# Patient Record
Sex: FEMALE | Race: WHITE | Employment: FULL TIME | URBAN - NONMETROPOLITAN AREA
[De-identification: names, ages, dates, MRNs, and addresses within clinical notes are randomized per-mention and may not be internally consistent; named-entity substitution may affect disease eponyms.]

---
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
## 2021-07-01 ENCOUNTER — OFFICE VISIT (OUTPATIENT)
Dept: FAMILY MEDICINE CLINIC | Age: 21
End: 2021-07-01
Payer: COMMERCIAL

## 2021-07-01 ENCOUNTER — HOSPITAL ENCOUNTER (OUTPATIENT)
Age: 21
Setting detail: SPECIMEN
Discharge: HOME | End: 2021-07-01
Payer: COMMERCIAL

## 2021-07-01 VITALS
HEIGHT: 65 IN | TEMPERATURE: 97.8 F | HEART RATE: 80 BPM | OXYGEN SATURATION: 99 % | SYSTOLIC BLOOD PRESSURE: 107 MMHG | RESPIRATION RATE: 12 BRPM | DIASTOLIC BLOOD PRESSURE: 70 MMHG | BODY MASS INDEX: 25.33 KG/M2 | WEIGHT: 152 LBS

## 2021-07-01 DIAGNOSIS — Z00.00 ENCOUNTER FOR MEDICAL EXAMINATION TO ESTABLISH CARE: Primary | ICD-10-CM

## 2021-07-01 DIAGNOSIS — R11.2 NAUSEA AND VOMITING, INTRACTABILITY OF VOMITING NOT SPECIFIED, UNSPECIFIED VOMITING TYPE: ICD-10-CM

## 2021-07-01 DIAGNOSIS — Z13.21 ENCOUNTER FOR VITAMIN DEFICIENCY SCREENING: ICD-10-CM

## 2021-07-01 DIAGNOSIS — Z13.29 THYROID DISORDER SCREEN: ICD-10-CM

## 2021-07-01 DIAGNOSIS — G43.909 MIGRAINE WITHOUT STATUS MIGRAINOSUS, NOT INTRACTABLE, UNSPECIFIED MIGRAINE TYPE: ICD-10-CM

## 2021-07-01 LAB
ABSOLUTE EOS #: 0.14 K/UL (ref 0–0.44)
ABSOLUTE IMMATURE GRANULOCYTE: 0.04 K/UL (ref 0–0.3)
ABSOLUTE LYMPH #: 2.43 K/UL (ref 1.2–5.2)
ABSOLUTE MONO #: 0.5 K/UL (ref 0.1–1.4)
ALBUMIN SERPL-MCNC: 4.8 G/DL (ref 3.5–5.2)
ALBUMIN/GLOBULIN RATIO: 1.8 (ref 1–2.5)
ALP BLD-CCNC: 69 U/L (ref 35–104)
ALT SERPL-CCNC: 15 U/L (ref 5–33)
ANION GAP SERPL CALCULATED.3IONS-SCNC: 14 MMOL/L (ref 9–17)
AST SERPL-CCNC: 17 U/L
BASOPHILS # BLD: 1 % (ref 0–2)
BASOPHILS ABSOLUTE: 0.07 K/UL (ref 0–0.2)
BILIRUB SERPL-MCNC: 0.32 MG/DL (ref 0.3–1.2)
BUN BLDV-MCNC: 7 MG/DL (ref 6–20)
BUN/CREAT BLD: NORMAL (ref 9–20)
CALCIUM SERPL-MCNC: 9.5 MG/DL (ref 8.6–10.4)
CHLORIDE BLD-SCNC: 105 MMOL/L (ref 98–107)
CO2: 20 MMOL/L (ref 20–31)
CREAT SERPL-MCNC: 0.5 MG/DL (ref 0.5–0.9)
DIFFERENTIAL TYPE: ABNORMAL
EOSINOPHILS RELATIVE PERCENT: 2 % (ref 1–4)
ESTIMATED AVERAGE GLUCOSE: 100 MG/DL
FERRITIN: 53 UG/L (ref 13–150)
FOLATE: 5.5 NG/ML
GFR AFRICAN AMERICAN: >60 ML/MIN
GFR NON-AFRICAN AMERICAN: >60 ML/MIN
GFR SERPL CREATININE-BSD FRML MDRD: NORMAL ML/MIN/{1.73_M2}
GFR SERPL CREATININE-BSD FRML MDRD: NORMAL ML/MIN/{1.73_M2}
GLUCOSE BLD-MCNC: 80 MG/DL (ref 70–99)
HBA1C MFR BLD: 5.1 % (ref 4–6)
HCT VFR BLD CALC: 45.2 % (ref 36.3–47.1)
HEMOGLOBIN: 14.4 G/DL (ref 11.9–15.1)
IMMATURE GRANULOCYTES: 1 %
INSULIN COMMENT: NORMAL
INSULIN REFERENCE RANGE:: NORMAL
INSULIN: 9.2 MU/L
IRON SATURATION: 22 % (ref 20–55)
IRON: 67 UG/DL (ref 37–145)
LYMPHOCYTES # BLD: 31 % (ref 25–45)
MAGNESIUM: 2.3 MG/DL (ref 1.6–2.6)
MCH RBC QN AUTO: 29.9 PG (ref 25.2–33.5)
MCHC RBC AUTO-ENTMCNC: 31.9 G/DL (ref 28.4–34.8)
MCV RBC AUTO: 93.8 FL (ref 82.6–102.9)
MONOCYTES # BLD: 6 % (ref 2–8)
NRBC AUTOMATED: 0 PER 100 WBC
PDW BLD-RTO: 12.1 % (ref 11.8–14.4)
PLATELET # BLD: 296 K/UL (ref 138–453)
PLATELET ESTIMATE: ABNORMAL
PMV BLD AUTO: 11.3 FL (ref 8.1–13.5)
POTASSIUM SERPL-SCNC: 4.2 MMOL/L (ref 3.7–5.3)
RBC # BLD: 4.82 M/UL (ref 3.95–5.11)
RBC # BLD: ABNORMAL 10*6/UL
SEG NEUTROPHILS: 59 % (ref 34–64)
SEGMENTED NEUTROPHILS ABSOLUTE COUNT: 4.65 K/UL (ref 1.8–8)
SODIUM BLD-SCNC: 139 MMOL/L (ref 135–144)
T3 FREE: 3.17 PG/ML (ref 2.02–4.43)
THYROXINE, FREE: 1.25 NG/DL (ref 0.93–1.7)
TOTAL IRON BINDING CAPACITY: 299 UG/DL (ref 250–450)
TOTAL PROTEIN: 7.5 G/DL (ref 6.4–8.3)
TSH SERPL DL<=0.05 MIU/L-ACNC: 0.99 MIU/L (ref 0.3–5)
UNSATURATED IRON BINDING CAPACITY: 232 UG/DL (ref 112–347)
VITAMIN B-12: 397 PG/ML (ref 232–1245)
VITAMIN D 25-HYDROXY: 18.3 NG/ML (ref 30–100)
WBC # BLD: 7.8 K/UL (ref 4.5–13.5)
WBC # BLD: ABNORMAL 10*3/UL

## 2021-07-01 PROCEDURE — 99203 OFFICE O/P NEW LOW 30 MIN: CPT | Performed by: NURSE PRACTITIONER

## 2021-07-01 RX ORDER — SUMATRIPTAN 25 MG/1
25 TABLET, FILM COATED ORAL
Qty: 9 TABLET | Refills: 1 | Status: SHIPPED | OUTPATIENT
Start: 2021-07-01 | End: 2021-07-01

## 2021-07-01 RX ORDER — ONDANSETRON 4 MG/1
4 TABLET, ORALLY DISINTEGRATING ORAL 3 TIMES DAILY PRN
Qty: 30 TABLET | Refills: 1 | Status: SHIPPED | OUTPATIENT
Start: 2021-07-01 | End: 2022-07-01

## 2021-07-01 RX ORDER — CALCIUM CARBONATE 300MG(750)
400 TABLET,CHEWABLE ORAL DAILY
Qty: 90 TABLET | Refills: 1 | Status: SHIPPED | OUTPATIENT
Start: 2021-07-01 | End: 2022-07-01

## 2021-07-01 RX ORDER — ONDANSETRON 4 MG/1
4 TABLET, FILM COATED ORAL DAILY PRN
Qty: 30 TABLET | Refills: 0 | Status: CANCELLED | OUTPATIENT
Start: 2021-07-01

## 2021-07-01 SDOH — ECONOMIC STABILITY: FOOD INSECURITY: WITHIN THE PAST 12 MONTHS, THE FOOD YOU BOUGHT JUST DIDN'T LAST AND YOU DIDN'T HAVE MONEY TO GET MORE.: NEVER TRUE

## 2021-07-01 SDOH — ECONOMIC STABILITY: FOOD INSECURITY: WITHIN THE PAST 12 MONTHS, YOU WORRIED THAT YOUR FOOD WOULD RUN OUT BEFORE YOU GOT MONEY TO BUY MORE.: NEVER TRUE

## 2021-07-01 ASSESSMENT — PATIENT HEALTH QUESTIONNAIRE - PHQ9
1. LITTLE INTEREST OR PLEASURE IN DOING THINGS: 0
2. FEELING DOWN, DEPRESSED OR HOPELESS: 0
SUM OF ALL RESPONSES TO PHQ QUESTIONS 1-9: 0
SUM OF ALL RESPONSES TO PHQ QUESTIONS 1-9: 0
SUM OF ALL RESPONSES TO PHQ9 QUESTIONS 1 & 2: 0
SUM OF ALL RESPONSES TO PHQ QUESTIONS 1-9: 0

## 2021-07-01 ASSESSMENT — SOCIAL DETERMINANTS OF HEALTH (SDOH): HOW HARD IS IT FOR YOU TO PAY FOR THE VERY BASICS LIKE FOOD, HOUSING, MEDICAL CARE, AND HEATING?: NOT HARD AT ALL

## 2021-07-01 NOTE — PROGRESS NOTES
Sunday Landau (:  2000) is a 22 y.o. female,New patient, here for evaluation of the following chief complaint(s):  Establish Care and Headache         ASSESSMENT/PLAN:  1. Encounter for medical examination to establish care  2. Migraine without status migrainosus, not intractable, unspecified migraine type  -     SUMAtriptan (IMITREX) 25 MG tablet; Take 1 tablet by mouth once as needed for Migraine Max 200 mg daily, Disp-9 tablet, R-1Normal  -     Magnesium 400 MG TABS; Take 400 mg by mouth daily, Disp-90 tablet, R-1Normal  -     ondansetron (ZOFRAN-ODT) 4 MG disintegrating tablet; Take 1 tablet by mouth 3 times daily as needed for Nausea or Vomiting, Disp-30 tablet, R-1Normal  -     LUIS ALFREDO Barreto MD, Neurology, [facility]  -     CBC With Auto Differential; Future  -     Comprehensive Metabolic Panel; Future  -     Hemoglobin A1C; Future  -     Insulin, Total; Future  -     Vitamin B12 & Folate; Future  -     Vitamin D 25 Hydroxy; Future  -     Iron And TIBC; Future  -     Ferritin; Future  -     Magnesium; Future  -     TSH; Future  -     T4, Free; Future  -     T3, Free; Future  -     Thyroid Antibodies; Future  3. Nausea and vomiting, intractability of vomiting not specified, unspecified vomiting type  -     SUMAtriptan (IMITREX) 25 MG tablet; Take 1 tablet by mouth once as needed for Migraine Max 200 mg daily, Disp-9 tablet, R-1Normal  -     Magnesium 400 MG TABS; Take 400 mg by mouth daily, Disp-90 tablet, R-1Normal  -     ondansetron (ZOFRAN-ODT) 4 MG disintegrating tablet; Take 1 tablet by mouth 3 times daily as needed for Nausea or Vomiting, Disp-30 tablet, R-1Normal  -     LUIS ALFREDO Barreto MD, Neurology, [facility]  -     CBC With Auto Differential; Future  -     Comprehensive Metabolic Panel; Future  -     Hemoglobin A1C; Future  -     Insulin, Total; Future  -     Vitamin B12 & Folate; Future  -     Vitamin D 25 Hydroxy; Future  -     Iron And TIBC; Future  -     Ferritin;  Future  -

## 2021-07-02 LAB
THYROGLOBULIN AB: <12 IU/ML (ref 0–40)
THYROID PEROXIDASE (TPO) AB: <4 IU/ML (ref 0–25)

## 2021-07-13 DIAGNOSIS — E55.9 VITAMIN D DEFICIENCY: Primary | ICD-10-CM

## 2021-07-13 RX ORDER — ERGOCALCIFEROL 1.25 MG/1
50000 CAPSULE ORAL WEEKLY
Qty: 12 CAPSULE | Refills: 1 | Status: SHIPPED | OUTPATIENT
Start: 2021-07-13 | End: 2021-07-25

## 2021-07-19 ENCOUNTER — TELEPHONE (OUTPATIENT)
Dept: FAMILY MEDICINE CLINIC | Age: 21
End: 2021-07-19

## 2021-07-19 DIAGNOSIS — Z32.01 POSITIVE URINE PREGNANCY TEST: Primary | ICD-10-CM

## 2021-07-19 NOTE — TELEPHONE ENCOUNTER
Spoke to Mani-significant other via phone regarding two positive at home pregnancy tests. Referral order placed for OB/GYN. Instructed to stop imitrex and zofran. Call office with any questions or concerns.

## 2021-07-19 NOTE — TELEPHONE ENCOUNTER
----- Message from Dom Kenyon sent at 7/17/2021  2:44 PM EDT -----  Subject: Message to Provider    QUESTIONS  Information for Provider? pt has a positive pregnancy test, please f/u  ---------------------------------------------------------------------------  --------------  CALL BACK INFO  What is the best way for the office to contact you? OK to leave message on   voicemail  Preferred Call Back Phone Number? [phone]  ---------------------------------------------------------------------------  --------------  SCRIPT ANSWERS  Relationship to Patient?  Self

## 2021-07-19 NOTE — TELEPHONE ENCOUNTER
----- Message from Trent Peyton sent at 7/17/2021  2:44 PM EDT -----  Subject: Message to Provider    QUESTIONS  Information for Provider? pt has a positive pregnancy test, please f/u  ---------------------------------------------------------------------------  --------------  CALL BACK INFO  What is the best way for the office to contact you? OK to leave message on   voicemail  Preferred Call Back Phone Number? [phone]  ---------------------------------------------------------------------------  --------------  SCRIPT ANSWERS  Relationship to Patient?  Self

## 2021-07-20 ENCOUNTER — TELEPHONE (OUTPATIENT)
Dept: FAMILY MEDICINE CLINIC | Age: 21
End: 2021-07-20

## 2021-07-20 NOTE — TELEPHONE ENCOUNTER
----- Message from [facility] sent at 7/17/2021  2:44 PM EDT -----  Subject: Message to Provider    QUESTIONS  Information for Provider? pt has a positive pregnancy test, please f/u  ---------------------------------------------------------------------------  --------------  CALL BACK INFO  What is the best way for the office to contact you? OK to leave message on   voicemail  Preferred Call Back Phone Number? [phone]  ---------------------------------------------------------------------------  --------------  SCRIPT ANSWERS  Relationship to Patient?  Self

## 2021-07-29 ENCOUNTER — OFFICE VISIT (OUTPATIENT)
Dept: OBGYN CLINIC | Age: 21
End: 2021-07-29
Payer: MEDICAID

## 2021-07-29 VITALS
SYSTOLIC BLOOD PRESSURE: 108 MMHG | DIASTOLIC BLOOD PRESSURE: 70 MMHG | WEIGHT: 152.5 LBS | HEIGHT: 65 IN | BODY MASS INDEX: 25.41 KG/M2 | HEART RATE: 86 BPM

## 2021-07-29 DIAGNOSIS — O23.41 URINARY TRACT INFECTION IN MOTHER DURING FIRST TRIMESTER OF PREGNANCY: ICD-10-CM

## 2021-07-29 DIAGNOSIS — N92.6 MISSED MENSES: Primary | ICD-10-CM

## 2021-07-29 DIAGNOSIS — Z3A.09 9 WEEKS GESTATION OF PREGNANCY: ICD-10-CM

## 2021-07-29 DIAGNOSIS — Z34.01 ENCOUNTER FOR SUPERVISION OF NORMAL FIRST PREGNANCY IN FIRST TRIMESTER: ICD-10-CM

## 2021-07-29 LAB
CONTROL: PRESENT
PREGNANCY TEST URINE, POC: POSITIVE

## 2021-07-29 PROCEDURE — 1036F TOBACCO NON-USER: CPT | Performed by: ADVANCED PRACTICE MIDWIFE

## 2021-07-29 PROCEDURE — 81025 URINE PREGNANCY TEST: CPT | Performed by: ADVANCED PRACTICE MIDWIFE

## 2021-07-29 PROCEDURE — G8419 CALC BMI OUT NRM PARAM NOF/U: HCPCS | Performed by: ADVANCED PRACTICE MIDWIFE

## 2021-07-29 PROCEDURE — G8427 DOCREV CUR MEDS BY ELIG CLIN: HCPCS | Performed by: ADVANCED PRACTICE MIDWIFE

## 2021-07-29 PROCEDURE — 99203 OFFICE O/P NEW LOW 30 MIN: CPT | Performed by: ADVANCED PRACTICE MIDWIFE

## 2021-07-29 RX ORDER — MAGNESIUM OXIDE 400 MG/1
400 TABLET ORAL 2 TIMES DAILY
COMMUNITY

## 2021-07-29 RX ORDER — CEPHALEXIN 500 MG/1
CAPSULE ORAL
COMMUNITY
Start: 2021-07-23

## 2021-07-29 RX ORDER — METOCLOPRAMIDE HYDROCHLORIDE 5 MG/5ML
10 SOLUTION ORAL
COMMUNITY

## 2021-07-29 SDOH — ECONOMIC STABILITY: TRANSPORTATION INSECURITY
IN THE PAST 12 MONTHS, HAS THE LACK OF TRANSPORTATION KEPT YOU FROM MEDICAL APPOINTMENTS OR FROM GETTING MEDICATIONS?: NO

## 2021-07-29 SDOH — ECONOMIC STABILITY: TRANSPORTATION INSECURITY
IN THE PAST 12 MONTHS, HAS LACK OF TRANSPORTATION KEPT YOU FROM MEETINGS, WORK, OR FROM GETTING THINGS NEEDED FOR DAILY LIVING?: NO

## 2021-08-05 PROBLEM — Z34.91 SUPERVISION OF NORMAL PREGNANCY IN FIRST TRIMESTER: Status: ACTIVE | Noted: 2021-08-05

## 2021-08-05 PROBLEM — O23.41 URINARY TRACT INFECTION IN MOTHER DURING FIRST TRIMESTER OF PREGNANCY: Status: ACTIVE | Noted: 2021-08-05

## 2021-08-17 ENCOUNTER — TELEPHONE (OUTPATIENT)
Dept: OBGYN CLINIC | Age: 21
End: 2021-08-17

## 2021-09-15 ENCOUNTER — TELEPHONE (OUTPATIENT)
Dept: OBGYN CLINIC | Age: 21
End: 2021-09-15

## 2021-10-01 ENCOUNTER — TELEPHONE (OUTPATIENT)
Dept: FAMILY MEDICINE CLINIC | Age: 21
End: 2021-10-01

## 2022-11-15 ENCOUNTER — OFFICE VISIT (OUTPATIENT)
Dept: PRIMARY CARE CLINIC | Age: 22
End: 2022-11-15
Payer: MEDICARE

## 2022-11-15 VITALS
OXYGEN SATURATION: 96 % | SYSTOLIC BLOOD PRESSURE: 106 MMHG | WEIGHT: 152 LBS | HEART RATE: 108 BPM | BODY MASS INDEX: 25.29 KG/M2 | DIASTOLIC BLOOD PRESSURE: 74 MMHG | TEMPERATURE: 99.6 F

## 2022-11-15 DIAGNOSIS — J98.01 BRONCHOSPASM: ICD-10-CM

## 2022-11-15 DIAGNOSIS — J06.9 UPPER RESPIRATORY TRACT INFECTION, UNSPECIFIED TYPE: Primary | ICD-10-CM

## 2022-11-15 PROCEDURE — 1036F TOBACCO NON-USER: CPT | Performed by: FAMILY MEDICINE

## 2022-11-15 PROCEDURE — 94640 AIRWAY INHALATION TREATMENT: CPT | Performed by: FAMILY MEDICINE

## 2022-11-15 PROCEDURE — 99214 OFFICE O/P EST MOD 30 MIN: CPT | Performed by: FAMILY MEDICINE

## 2022-11-15 PROCEDURE — G8419 CALC BMI OUT NRM PARAM NOF/U: HCPCS | Performed by: FAMILY MEDICINE

## 2022-11-15 PROCEDURE — G8427 DOCREV CUR MEDS BY ELIG CLIN: HCPCS | Performed by: FAMILY MEDICINE

## 2022-11-15 PROCEDURE — G8484 FLU IMMUNIZE NO ADMIN: HCPCS | Performed by: FAMILY MEDICINE

## 2022-11-15 RX ORDER — PREDNISONE 20 MG/1
40 TABLET ORAL DAILY
Qty: 10 TABLET | Refills: 0 | Status: SHIPPED | OUTPATIENT
Start: 2022-11-15 | End: 2022-11-20

## 2022-11-15 RX ORDER — BENZONATATE 200 MG/1
200 CAPSULE ORAL 3 TIMES DAILY PRN
Qty: 21 CAPSULE | Refills: 0 | Status: SHIPPED | OUTPATIENT
Start: 2022-11-15

## 2022-11-15 RX ORDER — ALBUTEROL SULFATE 2.5 MG/3ML
2.5 SOLUTION RESPIRATORY (INHALATION) ONCE
Status: COMPLETED | OUTPATIENT
Start: 2022-11-15 | End: 2022-11-15

## 2022-11-15 RX ORDER — ALBUTEROL SULFATE 90 UG/1
2 AEROSOL, METERED RESPIRATORY (INHALATION) 4 TIMES DAILY PRN
Qty: 18 G | Refills: 0 | Status: SHIPPED | OUTPATIENT
Start: 2022-11-15

## 2022-11-15 RX ADMIN — Medication 0.5 MG: at 11:57

## 2022-11-15 RX ADMIN — ALBUTEROL SULFATE 2.5 MG: 2.5 SOLUTION RESPIRATORY (INHALATION) at 11:56

## 2022-11-15 NOTE — PROGRESS NOTES
Subjective:  Aundra Resendiz presents for   Chief Complaint   Patient presents with    Cough     Started last Friday. She states that she feels like its progressing and getting worse. Taking tylenol and dayquil    Generalized Body Aches    Nasal Congestion    Headache     Sx started 5 days ago    No fevers    Nose is completely plugged    Fluids are good    Appetite is down    Tylenol cold and day quill-    Headache bothers her      Patient Active Problem List   Diagnosis    Supervision of normal pregnancy in first trimester    Urinary tract infection in mother during first trimester of pregnancy         Review of Systems:  General: no significant weight changes. Respiratory: no cough, pleuritic chest pain, dyspnea, or wheezing  Cardiovascular: no pain, CHRISTINE, orthopnea, palpitations or claudication  Gastrointestinal: no chronic nausea, vomiting, heartburn, diarrhea, constipation, bloating, or abdominal pain. No bloody or black stools. Objective:  Physical Exam   Vitals: Wt Readings from Last 3 Encounters:   11/15/22 152 lb (68.9 kg)   07/29/21 152 lb 8 oz (69.2 kg)   07/01/21 152 lb (68.9 kg)     Ht Readings from Last 3 Encounters:   07/29/21 5' 5\" (1.651 m)   07/01/21 5' 5\" (1.651 m)   05/17/19 5' 5\" (1.651 m) (62 %, Z= 0.31)*     * Growth percentiles are based on CDC (Girls, 2-20 Years) data. Body mass index is 25.29 kg/m². Vitals:    11/15/22 1117   BP: 106/74   Site: Right Upper Arm   Position: Sitting   Cuff Size: Medium Adult   Pulse: (!) 108   Temp: 99.6 °F (37.6 °C)   SpO2: 96%   Weight: 152 lb (68.9 kg)       Constitutional: She is oriented to person, place, and time. She appears well-developed and well-nourished and in no acute distress. Answers all my questions appropriately. Head: Normocephalic and atraumatic. Eyes:conjunctiva appear normal.    Right Ear: External ear normal. TM is clear  Left Ear: External ear normal. TM is clear    Nose: pink, non-edematous mucosa. No polyps.   No septal deviation    Throat: no erythema, tonsillar hypertrophy or exudate. No ulcerations noted. Lips/Teeth/Gums all appear normal.    Neck: Normal range of motion. Neck supple. No tracheal deviation present. No abnormal lymphadenopathy. No JVD noted. Carotids are clear bilaterally. No thyroid masses noted. Heart: RRR without murmur. No S3, S4, or gallop noted. Chest:   decreased breath sounds with bibasilar wheezing noted  No respiratory retractions noted. Wall has symmetrical movement with respirations. After duoneb treatemnt, her lungs were clear and had good breasth sounds    Assessment:   Encounter Diagnoses   Name Primary? Upper respiratory tract infection, unspecified type Yes    Bronchospasm          Plan:     There are no discontinued medications. THE ABOVE NOTED DISCONTINUED MEDS MAY ONLY BE FROM 'CLEANING UP' THE MED LIST AND WERE NOT ACTUALLY CANCELED;  SEE CHART FOR DETAILS! Orders Placed This Encounter   Medications    albuterol (PROVENTIL) nebulizer solution 2.5 mg     Order Specific Question:   Initiate RT Bronchodilator Protocol     Answer:   Yes - ED protocol    ipratropium (ATROVENT) 0.02 % nebulizer solution 0.5 mg     Order Specific Question:   Initiate RT Bronchodilator Protocol     Answer:   Yes - ED protocol    benzonatate (TESSALON) 200 MG capsule     Sig: Take 1 capsule by mouth 3 times daily as needed for Cough     Dispense:  21 capsule     Refill:  0    albuterol sulfate HFA (VENTOLIN HFA) 108 (90 Base) MCG/ACT inhaler     Sig: Inhale 2 puffs into the lungs 4 times daily as needed for Wheezing     Dispense:  18 g     Refill:  0    predniSONE (DELTASONE) 20 MG tablet     Sig: Take 2 tablets by mouth daily for 5 days     Dispense:  10 tablet     Refill:  0     No orders of the defined types were placed in this encounter. Return in about 2 weeks (around 11/29/2022). There are no Patient Instructions on file for this visit.   Follow up with your provider  Note for work

## 2022-11-15 NOTE — LETTER
[address]  [address] [place] [address]  Phone: [phone]  Fax: [phone]    Bekah Mcnair MD        November 15, 2022     Patient: David South   YOB: 2000   Date of Visit: 11/15/2022       To Whom it May Concern:    David South was seen in my clinic on 11/15/2022. She may return to work on 11/16/2022. If you have any questions or concerns, please don't hesitate to call.     Sincerely,         Bekah Mcnair MD

## 2022-11-28 ENCOUNTER — OFFICE VISIT (OUTPATIENT)
Dept: PRIMARY CARE CLINIC | Age: 22
End: 2022-11-28
Payer: MEDICARE

## 2022-11-28 VITALS
TEMPERATURE: 99.6 F | WEIGHT: 180 LBS | BODY MASS INDEX: 29.95 KG/M2 | DIASTOLIC BLOOD PRESSURE: 78 MMHG | HEART RATE: 100 BPM | OXYGEN SATURATION: 98 % | SYSTOLIC BLOOD PRESSURE: 110 MMHG

## 2022-11-28 DIAGNOSIS — H69.83 DYSFUNCTION OF BOTH EUSTACHIAN TUBES: Primary | ICD-10-CM

## 2022-11-28 DIAGNOSIS — K52.9 GASTROENTERITIS: ICD-10-CM

## 2022-11-28 PROCEDURE — 1036F TOBACCO NON-USER: CPT | Performed by: FAMILY MEDICINE

## 2022-11-28 PROCEDURE — 99214 OFFICE O/P EST MOD 30 MIN: CPT | Performed by: FAMILY MEDICINE

## 2022-11-28 PROCEDURE — G8419 CALC BMI OUT NRM PARAM NOF/U: HCPCS | Performed by: FAMILY MEDICINE

## 2022-11-28 PROCEDURE — G8427 DOCREV CUR MEDS BY ELIG CLIN: HCPCS | Performed by: FAMILY MEDICINE

## 2022-11-28 PROCEDURE — G8484 FLU IMMUNIZE NO ADMIN: HCPCS | Performed by: FAMILY MEDICINE

## 2022-11-28 RX ORDER — FLUTICASONE PROPIONATE 50 MCG
2 SPRAY, SUSPENSION (ML) NASAL DAILY
Qty: 16 G | Refills: 0 | Status: SHIPPED | OUTPATIENT
Start: 2022-11-28

## 2022-11-28 NOTE — PROGRESS NOTES
Subjective:  Sera Chase presents for   Chief Complaint   Patient presents with    Ear Fullness     Bilateral ear congestion for 1 week. No dishcarge    Pressurre in ears    No cough, minimal nasal congestion    No fevers    Tried dayquill - no hlep    Started vomting today , she last vomited about 3 pm today    Was having diarrhea everytime she threw up. Her boyfriend had similar gi sx 2 days ago that lasted only 1 day      Patient Active Problem List   Diagnosis    Supervision of normal pregnancy in first trimester    Urinary tract infection in mother during first trimester of pregnancy           Objective:  Physical Exam   Vitals: Wt Readings from Last 3 Encounters:   11/28/22 180 lb (81.6 kg)   11/15/22 152 lb (68.9 kg)   07/29/21 152 lb 8 oz (69.2 kg)     Ht Readings from Last 3 Encounters:   07/29/21 5' 5\" (1.651 m)   07/01/21 5' 5\" (1.651 m)   05/17/19 5' 5\" (1.651 m) (62 %, Z= 0.31)*     * Growth percentiles are based on CDC (Girls, 2-20 Years) data. Body mass index is 29.95 kg/m². Vitals:    11/28/22 1639   BP: 110/78   Site: Left Upper Arm   Position: Sitting   Cuff Size: Medium Adult   Pulse: 100   Temp: 99.6 °F (37.6 °C)   TempSrc: Tympanic   SpO2: 98%   Weight: 180 lb (81.6 kg)       Constitutional: She is oriented to person, place, and time. She appears well-developed and well-nourished and in no acute distress. Answers all my questions appropriately. Head: Normocephalic and atraumatic. Eyes:conjunctiva appear normal.    Right Ear: External ear normal. TM is clear  Left Ear: External ear normal. TM is clear    Nose: pink, non-edematous mucosa. No polyps. No septal deviation    Throat: no erythema, tonsillar hypertrophy or exudate. No ulcerations noted. Lips/Teeth/Gums all appear normal.    Neck: Normal range of motion. Neck supple. No tracheal deviation present. No abnormal lymphadenopathy. No JVD noted. Carotids are clear bilaterally. No thyroid masses noted.     Heart: RRR without murmur. No S3, S4, or gallop noted. Chest:   Good breath sounds noted. Clear to auscultation bilaterally. No rales, wheezes, or rhonchi noted. No respiratory retractions noted. Wall has symmetrical movement with respirations. Abdomen: No distension noted.  + bowel sounds in all quadrants which are normoactive. No bruits noted. No masses could be palpated. No unusual pulsatile masses noted. To deep palpation, patient denied any significant pain. No rebound, guarding or rigidity noted to my exam.        Assessment:   Encounter Diagnoses   Name Primary? Dysfunction of both eustachian tubes Yes    Gastroenteritis          Plan:     Push fluids- handout given    Use the flonase    Can try otc pseudofed, btu is warned about side effects. There are no discontinued medications. THE ABOVE NOTED DISCONTINUED MEDS MAY ONLY BE FROM 'CLEANING UP' THE MED LIST AND WERE NOT ACTUALLY CANCELED;  SEE CHART FOR DETAILS! Orders Placed This Encounter   Medications    fluticasone (FLONASE) 50 MCG/ACT nasal spray     Si sprays by Nasal route daily     Dispense:  16 g     Refill:  0     No orders of the defined types were placed in this encounter. Return in about 2 weeks (around 2022). Patient Instructions   In order to maintain hydration please follow the following instructions. Use fluids such as Pedialyte, diluted Gatorade (without caffeine), or diluted juices (not my first choice, but it is better than water). Target goal is to drink 1000 ml (about 1 quart of fluid) every 2-3 hours. Start with 2 sips (equivalent to 2 tablespoons or 30 ml) every 5 minutes for the first 30 minutes. If this goes well, you can increase the frequency to every 3 minutes. If vomiting, maintain a slower rate of intake until you tolerate the fluid well. Patients without vomiting can drink faster, as tolerated.   After an intake of 250 ml (about 8 ounces) has been successfully completed without vomiting, and if nausea is well controlled, intake can increase to 4 tablespoons (equivalent to 60 ml every 3-5 minutes).    Follow up with your provider

## 2022-11-28 NOTE — LETTER
[facility]  [address] [place] [address]  Phone: [phone]  Fax: [phone]    Vadim Smith MD        November 28, 2022     Patient: Betty Thakur   YOB: 2000   Date of Visit: 11/28/2022       To Whom it May Concern:    Betty Thakur was seen in my clinic on 11/28/2022. She may return to work on 11/30/2022. If you have any questions or concerns, please don't hesitate to call.     Sincerely,         Vadim Smith MD

## 2022-12-20 ENCOUNTER — HOSPITAL ENCOUNTER (OUTPATIENT)
Age: 22
Setting detail: SPECIMEN
Discharge: HOME | End: 2022-12-20

## 2022-12-20 ENCOUNTER — OFFICE VISIT (OUTPATIENT)
Dept: PRIMARY CARE CLINIC | Age: 22
End: 2022-12-20
Payer: MEDICARE

## 2022-12-20 VITALS — HEART RATE: 119 BPM | WEIGHT: 170 LBS | TEMPERATURE: 99.3 F | OXYGEN SATURATION: 98 % | BODY MASS INDEX: 28.29 KG/M2

## 2022-12-20 DIAGNOSIS — R11.0 NAUSEA: ICD-10-CM

## 2022-12-20 DIAGNOSIS — J02.9 SORE THROAT: ICD-10-CM

## 2022-12-20 DIAGNOSIS — J10.1 INFLUENZA A: Primary | ICD-10-CM

## 2022-12-20 DIAGNOSIS — R19.7 DIARRHEA, UNSPECIFIED TYPE: ICD-10-CM

## 2022-12-20 LAB
INFLUENZA A ANTIBODY: NORMAL
INFLUENZA B ANTIBODY: NORMAL

## 2022-12-20 PROCEDURE — 87804 INFLUENZA ASSAY W/OPTIC: CPT | Performed by: PHYSICIAN ASSISTANT

## 2022-12-20 PROCEDURE — G8419 CALC BMI OUT NRM PARAM NOF/U: HCPCS | Performed by: PHYSICIAN ASSISTANT

## 2022-12-20 PROCEDURE — G8427 DOCREV CUR MEDS BY ELIG CLIN: HCPCS | Performed by: PHYSICIAN ASSISTANT

## 2022-12-20 PROCEDURE — 99213 OFFICE O/P EST LOW 20 MIN: CPT | Performed by: PHYSICIAN ASSISTANT

## 2022-12-20 PROCEDURE — G8484 FLU IMMUNIZE NO ADMIN: HCPCS | Performed by: PHYSICIAN ASSISTANT

## 2022-12-20 PROCEDURE — 1036F TOBACCO NON-USER: CPT | Performed by: PHYSICIAN ASSISTANT

## 2022-12-20 RX ORDER — ONDANSETRON 4 MG/1
4 TABLET, ORALLY DISINTEGRATING ORAL ONCE
Status: COMPLETED | OUTPATIENT
Start: 2022-12-20 | End: 2022-12-20

## 2022-12-20 RX ORDER — ONDANSETRON 4 MG/1
4 TABLET, ORALLY DISINTEGRATING ORAL 3 TIMES DAILY PRN
Qty: 30 TABLET | Refills: 0 | Status: SHIPPED | OUTPATIENT
Start: 2022-12-20

## 2022-12-20 RX ADMIN — ONDANSETRON 4 MG: 4 TABLET, ORALLY DISINTEGRATING ORAL at 12:52

## 2022-12-20 NOTE — PROGRESS NOTES
[address]  [address] [place] [address]  Phone: [phone]  Fax: [place]    Pt Name: Nirmal Jade  MRN: [id number]  Tjgfsherman 2000  Date of evaluation: 12/20/2022  Provider: Sophia Ng, [facility]       Chief Complaint   Patient presents with    Nausea & Vomiting    Cough    Diarrhea    Pharyngitis    Headache           HISTORY OF PRESENT ILLNESS  (Location/Symptom, Timing/Onset, Context/Setting, Quality, Duration, Modifying Factors, Severity.)   Nirmal Jade is a 25 y.o. White (non-) [1] female who presents to the office for evaluation of      Pharyngitis  This is a new problem. The current episode started in the past 7 days. Associated symptoms include congestion, coughing, fatigue, headaches, nausea, a sore throat and vomiting. Pertinent negatives include no chills or diaphoresis. The symptoms are aggravated by drinking, eating and swallowing. Nursing Notes were reviewed. REVIEW OF SYSTEMS    (2-9 systems for level 4, 10 or more for level 5)     Review of Systems   Constitutional:  Positive for fatigue. Negative for chills and diaphoresis. HENT:  Positive for congestion, postnasal drip and sore throat. Negative for ear discharge and ear pain. Eyes: Negative. Respiratory:  Positive for cough. Cardiovascular: Negative. Gastrointestinal:  Positive for nausea and vomiting. Genitourinary: Negative. Neurological:  Positive for headaches. Except as noted above the remainder of the review of systems was reviewed andnegative. PAST MEDICAL HISTORY   History reviewed. Past Medical History:   Diagnosis Date    Migraine          SURGICAL HISTORY     History reviewed. No past surgical history on file.       CURRENT MEDICATIONS       Current Outpatient Medications   Medication Sig Dispense Refill    ondansetron (ZOFRAN-ODT) 4 MG disintegrating tablet Place 1 tablet under the tongue 3 times daily as needed for Nausea or Vomiting 30 tablet 0    amoxicillin (AMOXIL) 500 MG capsule Take 1 capsule by mouth 2 times daily for 10 days 20 capsule 0    fluticasone (FLONASE) 50 MCG/ACT nasal spray 2 sprays by Nasal route daily 16 g 0    albuterol sulfate HFA (VENTOLIN HFA) 108 (90 Base) MCG/ACT inhaler Inhale 2 puffs into the lungs 4 times daily as needed for Wheezing 18 g 0     No current facility-administered medications for this visit. ALLERGIES     Patient has no known allergies. FAMILY HISTORY           Problem Relation Age of Onset    Lung Cancer Paternal Grandfather     Cancer Paternal Grandmother         Skin    Breast Cancer Maternal Aunt     Ovarian Cancer Neg Hx     Diabetes Neg Hx      Family Status   Relation Name Status    PGF      PGM      MGM  Alive    MGF      Father  Alive    Mother  Alive    Brett Bold  (Not Specified)    Neg Hx  (Not Specified)          SOCIAL HISTORY      reports that she has never smoked. She has never used smokeless tobacco. She reports that she does not drink alcohol and does not use drugs. PHYSICAL EXAM    (up to 7 for level 4, 8 or more for level 5)     Vitals:    22 1016   Pulse: (!) 119   Temp: 99.3 °F (37.4 °C)   SpO2: 98%   Weight: 170 lb (77.1 kg)         Physical Exam  Vitals and nursing note reviewed. Constitutional:       General: She is not in acute distress. Appearance: Normal appearance. She is not ill-appearing. HENT:      Head: Normocephalic and atraumatic. Right Ear: External ear normal.      Left Ear: External ear normal.      Nose: Congestion present. Mouth/Throat:      Mouth: Mucous membranes are moist.      Pharynx: Posterior oropharyngeal erythema present. Eyes:      Extraocular Movements: Extraocular movements intact. Conjunctiva/sclera: Conjunctivae normal.      Pupils: Pupils are equal, round, and reactive to light.    Pulmonary:      Effort: Pulmonary effort is normal.   Abdominal: Palpations: Abdomen is soft. Skin:     General: Skin is warm and dry. Neurological:      Mental Status: She is alert and oriented to person, place, and time. DIFFERENTIAL DIAGNOSIS:       Rigoberto Al reviewed the disposition diagnosis with the patient and or their family/guardian. I have answered their questions and given discharge instructions. They voiced understanding of these instructions and did not have anyfurther questions or complaints. PROCEDURES:  Orders Placed This Encounter   Procedures    Strep A DNA probe, amplification    POCT Influenza A/B       Results for orders placed or performed in visit on 12/20/22   POCT Influenza A/B   Result Value Ref Range    Influenza A Ab neg     Influenza B Ab neg        FINALIMPRESSION      Visit Diagnoses and Associated Orders       Influenza A    -  Primary         Sore throat        Strep A DNA probe, amplification [27639 Custom]      POCT Influenza A/B [[id number] Custom]           Nausea             Diarrhea, unspecified type             ORDERS WITHOUT AN ASSOCIATED DIAGNOSIS    ondansetron (ZOFRAN-ODT) disintegrating tablet 4 mg [36171]      ondansetron (ZOFRAN-ODT) 4 MG disintegrating tablet [[id number]]                PLAN     Return if symptoms worsen or fail to improve. DISCHARGEMEDICATIONS:  Orders Placed This Encounter   Medications    ondansetron (ZOFRAN-ODT) disintegrating tablet 4 mg    ondansetron (ZOFRAN-ODT) 4 MG disintegrating tablet     Sig: Place 1 tablet under the tongue 3 times daily as needed for Nausea or Vomiting     Dispense:  30 tablet     Refill:  0         Plan:  + influenza  Tylenol/Motrin as needed for fever/myalgia. May use OTC flu/cold preparations if desired. Education provided regarding influenza at this time. Encouraged good hand and respiratory hygiene. Patient agreeable to treatment plan. Follow up if symptoms do not improve/worsen. Discussed symptoms that will warrant urgent ED evaluation/treatment.     Patient instructed to return to the office if symptoms worsen, return, or have any other concerns. Patient understands and is agreeable.          Geetha Ellsworth PA-C 12/29/2022 7:35 PM

## 2022-12-20 NOTE — LETTER
[facility] [address]  [address] [place] [address]  Phone: [phone]  Fax: [phone]    Geetha Ellsworth [place]        December 20, 2022     Patient: Adrienne Magallanes   YOB: 2000   Date of Visit: 12/20/2022       To Whom it May Concern:    Adrienne Magallanes was seen in my clinic on 12/20/2022. She may return to work on 12/22/2022. If you have any questions or concerns, please don't hesitate to call.     Sincerely,         Geetha Ellsworth PA-C

## 2022-12-21 DIAGNOSIS — J02.0 STREP PHARYNGITIS: Primary | ICD-10-CM

## 2022-12-21 LAB
DIRECT EXAM: ABNORMAL
SPECIMEN DESCRIPTION: ABNORMAL

## 2022-12-21 RX ORDER — PREDNISONE 20 MG/1
20 TABLET ORAL 2 TIMES DAILY
Qty: 10 TABLET | Refills: 0 | Status: SHIPPED | OUTPATIENT
Start: 2022-12-21 | End: 2022-12-26

## 2022-12-21 RX ORDER — AMOXICILLIN 500 MG/1
500 CAPSULE ORAL 2 TIMES DAILY
Qty: 20 CAPSULE | Refills: 0 | Status: SHIPPED | OUTPATIENT
Start: 2022-12-21 | End: 2022-12-31

## 2022-12-29 ASSESSMENT — ENCOUNTER SYMPTOMS
SORE THROAT: 1
NAUSEA: 1
EYES NEGATIVE: 1
VOMITING: 1
COUGH: 1